# Patient Record
Sex: FEMALE | Employment: FULL TIME | URBAN - METROPOLITAN AREA
[De-identification: names, ages, dates, MRNs, and addresses within clinical notes are randomized per-mention and may not be internally consistent; named-entity substitution may affect disease eponyms.]

---

## 2021-09-30 ENCOUNTER — TELEPHONE (OUTPATIENT)
Dept: PRIMARY CARE CLINIC | Age: 56
End: 2021-09-30

## 2021-09-30 NOTE — TELEPHONE ENCOUNTER
----- Message from Flor Guerrero sent at 9/30/2021 10:17 AM EDT -----  Subject: Message to Provider    QUESTIONS  Information for Provider? patient is requesting for Dr. Caro Ruiz to be   her pcp since she's been seeing Dr. Steven Paul as her pcp from outside [address] [place] [address],   patient wanted to be seen for a yearly physical, since patient has no pcp   on file yet and requested to be seen by Dr. Steven Paul, patient is asking if   possible to get a yearly physical and be Dr. Snehal Triplett new patient the same   time. please call patient and advise.  ---------------------------------------------------------------------------  --------------  CALL BACK INFO  What is the best way for the office to contact you? OK to leave message on   voicemail  Preferred Call Back Phone Number? [phone]  ---------------------------------------------------------------------------  --------------  SCRIPT ANSWERS  Relationship to Patient?  Self

## 2021-10-07 ENCOUNTER — OFFICE VISIT (OUTPATIENT)
Dept: PRIMARY CARE CLINIC | Age: 56
End: 2021-10-07
Payer: COMMERCIAL

## 2021-10-07 VITALS
DIASTOLIC BLOOD PRESSURE: 70 MMHG | HEART RATE: 74 BPM | BODY MASS INDEX: 29.55 KG/M2 | OXYGEN SATURATION: 98 % | WEIGHT: 195 LBS | SYSTOLIC BLOOD PRESSURE: 122 MMHG | HEIGHT: 68 IN

## 2021-10-07 DIAGNOSIS — Z00.00 WELL ADULT EXAM: Primary | ICD-10-CM

## 2021-10-07 DIAGNOSIS — Z13.9 SCREENING DUE: ICD-10-CM

## 2021-10-07 PROCEDURE — 99396 PREV VISIT EST AGE 40-64: CPT | Performed by: FAMILY MEDICINE

## 2021-10-07 RX ORDER — OMEPRAZOLE 20 MG/1
20 CAPSULE, DELAYED RELEASE ORAL DAILY
Qty: 90 CAPSULE | Refills: 3 | Status: SHIPPED | OUTPATIENT
Start: 2021-10-07 | End: 2022-08-23

## 2021-10-07 RX ORDER — ALBUTEROL SULFATE 90 UG/1
2 AEROSOL, METERED RESPIRATORY (INHALATION) EVERY 6 HOURS PRN
COMMUNITY
End: 2022-07-18 | Stop reason: SDUPTHER

## 2021-10-07 RX ORDER — TRIAMCINOLONE ACETONIDE 55 UG/1
SPRAY, METERED NASAL
COMMUNITY

## 2021-10-07 RX ORDER — ESTRADIOL 2 MG/1
TABLET ORAL
COMMUNITY
Start: 2021-01-29

## 2021-10-07 RX ORDER — CETIRIZINE HYDROCHLORIDE 10 MG/1
CAPSULE, LIQUID FILLED ORAL
COMMUNITY

## 2021-10-07 RX ORDER — OMEPRAZOLE 20 MG/1
CAPSULE, DELAYED RELEASE ORAL
COMMUNITY
End: 2021-10-07 | Stop reason: SDUPTHER

## 2021-10-07 SDOH — ECONOMIC STABILITY: FOOD INSECURITY: WITHIN THE PAST 12 MONTHS, YOU WORRIED THAT YOUR FOOD WOULD RUN OUT BEFORE YOU GOT MONEY TO BUY MORE.: NEVER TRUE

## 2021-10-07 SDOH — ECONOMIC STABILITY: FOOD INSECURITY: WITHIN THE PAST 12 MONTHS, THE FOOD YOU BOUGHT JUST DIDN'T LAST AND YOU DIDN'T HAVE MONEY TO GET MORE.: NEVER TRUE

## 2021-10-07 ASSESSMENT — PATIENT HEALTH QUESTIONNAIRE - PHQ9
SUM OF ALL RESPONSES TO PHQ QUESTIONS 1-9: 0
1. LITTLE INTEREST OR PLEASURE IN DOING THINGS: 0
SUM OF ALL RESPONSES TO PHQ9 QUESTIONS 1 & 2: 0
2. FEELING DOWN, DEPRESSED OR HOPELESS: 0

## 2021-10-07 ASSESSMENT — SOCIAL DETERMINANTS OF HEALTH (SDOH): HOW HARD IS IT FOR YOU TO PAY FOR THE VERY BASICS LIKE FOOD, HOUSING, MEDICAL CARE, AND HEATING?: NOT HARD AT ALL

## 2021-10-11 ASSESSMENT — ENCOUNTER SYMPTOMS
RESPIRATORY NEGATIVE: 1
ALLERGIC/IMMUNOLOGIC NEGATIVE: 1
EYES NEGATIVE: 1
GASTROINTESTINAL NEGATIVE: 1

## 2021-10-11 NOTE — PROGRESS NOTES
Subjective:      Patient ID: Antonella Cantu is a 49 y.o. female. Doing well      Review of Systems   Constitutional: Negative. HENT: Negative. Eyes: Negative. Respiratory: Negative. Cardiovascular: Negative. Gastrointestinal: Negative. Endocrine: Negative. Genitourinary: Negative. Musculoskeletal: Negative. Allergic/Immunologic: Negative. Neurological: Negative. Hematological: Negative. Psychiatric/Behavioral: Negative. All other systems reviewed and are negative. Objective:   Physical Exam  Vitals reviewed. Constitutional:       Appearance: She is normal weight. HENT:      Head: Normocephalic. Right Ear: Tympanic membrane normal.      Left Ear: Tympanic membrane normal.      Nose: Nose normal.   Eyes:      Pupils: Pupils are equal, round, and reactive to light. Cardiovascular:      Rate and Rhythm: Normal rate and regular rhythm. Pulmonary:      Effort: Pulmonary effort is normal.      Breath sounds: Normal breath sounds. Musculoskeletal:         General: Normal range of motion. Cervical back: Normal range of motion. Skin:     General: Skin is warm. Neurological:      General: No focal deficit present. Mental Status: She is alert. Psychiatric:         Mood and Affect: Mood normal.         Thought Content: Thought content normal.         Assessment:      1. Screening due    2. Well adult exam            Plan:      Angel Espino was seen today for annual exam and other. Diagnoses and all orders for this visit:    Screening due  -     Lipid, Fasting; Future  -     Hemoglobin A1C; Future  -     CBC With Auto Differential; Future  -     TSH; Future    Well adult exam    Other orders  -     omeprazole (PRILOSEC) 20 MG delayed release capsule;  Take 1 capsule by mouth Daily                Electronically signed by Jaya Mccormick MD on 10/7/2021 at 9:30 AM

## 2021-10-13 DIAGNOSIS — Z13.9 SCREENING DUE: ICD-10-CM

## 2021-10-13 LAB
AVERAGE GLUCOSE: NORMAL
BASOPHILS ABSOLUTE: NORMAL
BASOPHILS RELATIVE PERCENT: NORMAL
CHOLESTEROL, FASTING: NORMAL
EOSINOPHILS ABSOLUTE: NORMAL
EOSINOPHILS RELATIVE PERCENT: NORMAL
HBA1C MFR BLD: NORMAL %
HCT VFR BLD CALC: NORMAL %
HDLC SERPL-MCNC: NORMAL MG/DL
HEMOGLOBIN: NORMAL
LDL CHOLESTEROL CALCULATED: NORMAL
LYMPHOCYTES ABSOLUTE: NORMAL
LYMPHOCYTES RELATIVE PERCENT: NORMAL
MCH RBC QN AUTO: NORMAL PG
MCHC RBC AUTO-ENTMCNC: NORMAL G/DL
MCV RBC AUTO: NORMAL FL
MONOCYTES ABSOLUTE: NORMAL
MONOCYTES RELATIVE PERCENT: NORMAL
NEUTROPHILS ABSOLUTE: NORMAL
NEUTROPHILS RELATIVE PERCENT: NORMAL
PDW BLD-RTO: NORMAL %
PLATELET # BLD: NORMAL 10*3/UL
PMV BLD AUTO: NORMAL FL
RBC # BLD: NORMAL 10*6/UL
TRIGLYCERIDE, FASTING: NORMAL
TSH SERPL DL<=0.05 MIU/L-ACNC: NORMAL M[IU]/L
WBC # BLD: NORMAL 10*3/UL

## 2022-02-16 ENCOUNTER — PATIENT MESSAGE (OUTPATIENT)
Dept: PRIMARY CARE CLINIC | Age: 57
End: 2022-02-16

## 2022-03-01 NOTE — TELEPHONE ENCOUNTER
From: Ami Fishman  To: Dr. Zhang Ledbetter: 2/16/2022 2:57 PM EST  Subject: Serevent Diskus    It looks like this was not filled last month due to my insurance requiring a 3 month supply.

## 2022-03-18 LAB — MAMMOGRAPHY, EXTERNAL: NORMAL

## 2022-07-18 ENCOUNTER — OFFICE VISIT (OUTPATIENT)
Dept: PRIMARY CARE CLINIC | Age: 57
End: 2022-07-18
Payer: COMMERCIAL

## 2022-07-18 VITALS
HEIGHT: 68 IN | WEIGHT: 197.4 LBS | BODY MASS INDEX: 29.92 KG/M2 | HEART RATE: 76 BPM | SYSTOLIC BLOOD PRESSURE: 124 MMHG | OXYGEN SATURATION: 99 % | DIASTOLIC BLOOD PRESSURE: 76 MMHG

## 2022-07-18 DIAGNOSIS — J45.40 MODERATE PERSISTENT ASTHMA, UNSPECIFIED WHETHER COMPLICATED: Primary | ICD-10-CM

## 2022-07-18 DIAGNOSIS — E78.2 MIXED HYPERLIPIDEMIA: ICD-10-CM

## 2022-07-18 PROCEDURE — 99214 OFFICE O/P EST MOD 30 MIN: CPT | Performed by: FAMILY MEDICINE

## 2022-07-18 RX ORDER — ALBUTEROL SULFATE 90 UG/1
2 AEROSOL, METERED RESPIRATORY (INHALATION) EVERY 6 HOURS PRN
Qty: 18 G | Refills: 3 | Status: SHIPPED | OUTPATIENT
Start: 2022-07-18

## 2022-07-18 ASSESSMENT — ENCOUNTER SYMPTOMS
DIARRHEA: 0
WHEEZING: 1
SORE THROAT: 0
CHEST TIGHTNESS: 0
SHORTNESS OF BREATH: 1
ABDOMINAL PAIN: 0
COUGH: 0
CONSTIPATION: 0
BACK PAIN: 0

## 2022-07-18 ASSESSMENT — PATIENT HEALTH QUESTIONNAIRE - PHQ9
SUM OF ALL RESPONSES TO PHQ QUESTIONS 1-9: 0
2. FEELING DOWN, DEPRESSED OR HOPELESS: 0
SUM OF ALL RESPONSES TO PHQ QUESTIONS 1-9: 0
1. LITTLE INTEREST OR PLEASURE IN DOING THINGS: 0
SUM OF ALL RESPONSES TO PHQ9 QUESTIONS 1 & 2: 0

## 2022-07-18 NOTE — PROGRESS NOTES
Subjective:     Patient ID: Damian Kidd is a 49 y.o. female    Asthma  She complains of shortness of breath and wheezing. There is no cough. Pertinent negatives include no appetite change, chest pain, fever, headaches or sore throat. Her past medical history is significant for asthma. Works customer service at [employer]   2 children      likes to remodel    Neg smoker   occasional drinker    Review of Systems   Constitutional:  Negative for activity change, appetite change, fatigue and fever. HENT:  Negative for sore throat. Eyes:  Negative for visual disturbance. Respiratory:  Positive for shortness of breath and wheezing. Negative for cough and chest tightness. Cardiovascular:  Negative for chest pain, palpitations and leg swelling. Gastrointestinal:  Negative for abdominal pain, constipation and diarrhea. Endocrine: Negative for cold intolerance. Genitourinary:  Negative for dysuria and urgency. Musculoskeletal:  Negative for back pain. Neurological:  Negative for dizziness, syncope and headaches. Hematological:  Does not bruise/bleed easily. Psychiatric/Behavioral:  Negative for confusion and sleep disturbance. The patient is not nervous/anxious. Objective:     Physical Exam  Vitals and nursing note reviewed. Constitutional:       General: She is not in acute distress. Appearance: Normal appearance. She is obese. She is not ill-appearing. HENT:      Head: Normocephalic. Right Ear: External ear normal.      Left Ear: External ear normal.      Nose: Nose normal.      Mouth/Throat:      Mouth: Mucous membranes are moist.      Pharynx: Oropharynx is clear. Eyes:      Conjunctiva/sclera: Conjunctivae normal.      Pupils: Pupils are equal, round, and reactive to light. Cardiovascular:      Rate and Rhythm: Normal rate and regular rhythm. Pulses: Normal pulses. Heart sounds: Normal heart sounds. No murmur heard.   Pulmonary:      Effort: Pulmonary effort is normal.      Breath sounds: Normal breath sounds. No wheezing. Musculoskeletal:         General: Normal range of motion. Cervical back: Neck supple. Right lower leg: No edema. Left lower leg: No edema. Lymphadenopathy:      Cervical: No cervical adenopathy. Skin:     General: Skin is warm and dry. Capillary Refill: Capillary refill takes less than 2 seconds. Neurological:      General: No focal deficit present. Mental Status: She is alert and oriented to person, place, and time. Psychiatric:         Mood and Affect: Mood normal.         Behavior: Behavior normal.       Assessment/Plan:     1. Moderate persistent asthma, unspecified whether complicated    2. BMI 30.0-30.9,adult    3. Mixed hyperlipidemia          Maegan Matthews was seen today for asthma. Diagnoses and all orders for this visit:    Moderate persistent asthma, unspecified whether complicated  -     salmeterol (SEREVENT DISKUS) 50 MCG/DOSE diskus inhaler; Inhale 1 puff into the lungs in the morning and 1 puff before bedtime. -     albuterol sulfate HFA (PROVENTIL;VENTOLIN;PROAIR) 108 (90 Base) MCG/ACT inhaler; Inhale 2 puffs into the lungs every 6 hours as needed for Wheezing    BMI 30.0-30.9,adult  Discussed the simple 7 and the anti-inflammatory lifestyle handouts given  Mixed hyperlipidemia  Found by chart review. Described the lipid levels to the patient. Discussed remedies. India Bauer MD    Please note that this chart was generated using voice recognition Dragon dictation software. Although every effort was made to ensure the accuracy of this automated transcription, some errors in transcription may have occurred.

## 2022-07-20 DIAGNOSIS — Z12.39 SCREENING BREAST EXAMINATION: Primary | ICD-10-CM

## 2022-08-23 ENCOUNTER — OFFICE VISIT (OUTPATIENT)
Dept: PRIMARY CARE CLINIC | Age: 57
End: 2022-08-23
Payer: COMMERCIAL

## 2022-08-23 VITALS
BODY MASS INDEX: 2.97 KG/M2 | SYSTOLIC BLOOD PRESSURE: 124 MMHG | DIASTOLIC BLOOD PRESSURE: 86 MMHG | HEIGHT: 68 IN | OXYGEN SATURATION: 100 % | HEART RATE: 116 BPM | WEIGHT: 19.6 LBS

## 2022-08-23 DIAGNOSIS — J02.9 PHARYNGITIS, UNSPECIFIED ETIOLOGY: Primary | ICD-10-CM

## 2022-08-23 DIAGNOSIS — R11.0 NAUSEA: ICD-10-CM

## 2022-08-23 DIAGNOSIS — H65.199 ACUTE OTITIS MEDIA WITH EFFUSION: ICD-10-CM

## 2022-08-23 DIAGNOSIS — J45.40 MODERATE PERSISTENT ASTHMA, UNSPECIFIED WHETHER COMPLICATED: ICD-10-CM

## 2022-08-23 LAB — S PYO AG THROAT QL: NORMAL

## 2022-08-23 PROCEDURE — 99214 OFFICE O/P EST MOD 30 MIN: CPT | Performed by: NURSE PRACTITIONER

## 2022-08-23 PROCEDURE — 87880 STREP A ASSAY W/OPTIC: CPT | Performed by: NURSE PRACTITIONER

## 2022-08-23 RX ORDER — DOXYCYCLINE HYCLATE 100 MG
100 TABLET ORAL 2 TIMES DAILY
Qty: 14 TABLET | Refills: 0 | Status: SHIPPED | OUTPATIENT
Start: 2022-08-23 | End: 2022-08-30

## 2022-08-23 RX ORDER — ALBUTEROL SULFATE 2.5 MG/3ML
2.5 SOLUTION RESPIRATORY (INHALATION) EVERY 6 HOURS PRN
Qty: 120 EACH | Refills: 3 | Status: SHIPPED | OUTPATIENT
Start: 2022-08-23

## 2022-08-23 RX ORDER — ONDANSETRON 4 MG/1
4 TABLET, ORALLY DISINTEGRATING ORAL 3 TIMES DAILY PRN
Qty: 21 TABLET | Refills: 0 | Status: SHIPPED | OUTPATIENT
Start: 2022-08-23

## 2022-08-23 ASSESSMENT — ENCOUNTER SYMPTOMS
SINUS PAIN: 1
SHORTNESS OF BREATH: 0
COLOR CHANGE: 0
RHINORRHEA: 0
VOMITING: 0
SORE THROAT: 1
DIARRHEA: 0
NAUSEA: 0
CHEST TIGHTNESS: 0
ABDOMINAL PAIN: 0

## 2022-08-23 NOTE — LETTER
[address] [facility]  [address] [place] [address]  Phone: [phone]  Fax: [phone]    RACHEL Bo CNP        August 23, 2022     Patient: Yash Murray   YOB: 1965   Date of Visit: 8/23/2022       To Whom It May Concern: It is my medical opinion that Sintia Sensing should remain out of work until 8/25/22. If you have any questions or concerns, please don't hesitate to call.     Sincerely,        RACHEL Bo CNP

## 2022-08-23 NOTE — PROGRESS NOTES
[address] [facility] [address] [address]  Dept: [phone]  Dept Fax: [phone]    Yash Murray is a 56 y.o. female who presentstoday for her medical conditions/complaints as noted below. Yash Murray is c/o of  Chief Complaint   Patient presents with    Generalized Body Aches     Started yesterday. Patient states the symptoms keep getting worse as the day goes on. Patient took an at home covid test and the result was negative. No fever last night or this morning. Chills    Pharyngitis          Headache         HPI:     Here with acute complaint of sore throat, chills, body aches and sore throat x 2 days  Negative Covid test today at her work  No improvement with otc meds  Had strep exposure, has concern for this  Is ill appearing but non-toxic, no acute distress noted  Denies GI symptoms  PO intake somewhat poor due to body aches, slightly tachycardic, discussed dehydration  PMH significant for asthma, she would like albuterol neb solution refilled for as needed use  When she is ill, she does experience exacerbation of SOB intermittently, she is otherwise well controlled with controller and rescue inhalers      No results found for: LABA1C          ( goal A1C is < 7)   No results found for: LABMICR  No results found for: LDLCHOLESTEROL, LDLCALC    (goal LDL is <100)   No results found for: AST, ALT, BUN, CR  BP Readings from Last 3 Encounters:   08/23/22 124/86   07/18/22 124/76   10/07/21 122/70          ([id number])    No past medical history on file. No past surgical history on file. No family history on file.     Social History     Tobacco Use    Smoking status: Never    Smokeless tobacco: Never   Substance Use Topics    Alcohol use: Yes     Comment: occasinally      Current Outpatient Medications   Medication Sig Dispense Refill    doxycycline hyclate (VIBRA-TABS) 100 MG tablet Take 1 tablet by mouth 2 times daily for 7 days 14 tablet 0 ondansetron (ZOFRAN-ODT) 4 MG disintegrating tablet Take 1 tablet by mouth 3 times daily as needed for Nausea or Vomiting 21 tablet 0    albuterol (PROVENTIL) (2.5 MG/3ML) 0.083% nebulizer solution Take 3 mLs by nebulization every 6 hours as needed for Wheezing 120 each 3    salmeterol (SEREVENT DISKUS) 50 MCG/DOSE diskus inhaler Inhale 1 puff into the lungs in the morning and 1 puff before bedtime. 3 each 3    albuterol sulfate HFA (PROVENTIL;VENTOLIN;PROAIR) 108 (90 Base) MCG/ACT inhaler Inhale 2 puffs into the lungs every 6 hours as needed for Wheezing 18 g 3    Multiple Vitamin (MULTIVITAMIN ADULT PO) Women's Multiple Vitamins      Cetirizine HCl (ZYRTEC ALLERGY) 10 MG CAPS Zyrtec   OTC daily      estradiol (ESTRACE) 2 MG tablet estradiol 2 mg tablet   Take 1 tablet every day by oral route. triamcinolone (NASACORT) 55 MCG/ACT nasal inhaler       omeprazole (PRILOSEC) 20 MG delayed release capsule Take 1 capsule by mouth Daily 90 capsule 3     No current facility-administered medications for this visit. Allergies   Allergen Reactions    Amoxicillin-Pot Clavulanate Nausea Only     Other reaction(s): GI Disturbance, Vomit, Vomiting  Other reaction(s): Vomit         Health Maintenance   Topic Date Due    Pneumococcal 0-64 years Vaccine (1 - PCV) Never done    HIV screen  Never done    Hepatitis C screen  Never done    DTaP/Tdap/Td vaccine (1 - Tdap) Never done    Shingles vaccine (1 of 2) Never done    COVID-19 Vaccine (2 - Booster for Linette series) 05/06/2021    Flu vaccine (1) 09/01/2022    Breast cancer screen  03/18/2023    Depression Screen  07/18/2023    Lipids  10/13/2026    Colorectal Cancer Screen  12/01/2026    Hepatitis A vaccine  Aged Out    Hepatitis B vaccine  Aged Out    Hib vaccine  Aged Out    Meningococcal (ACWY) vaccine  Aged Out       Subjective:      Review of Systems   Constitutional:  Positive for chills and fatigue. Negative for activity change and fever.    HENT:  Positive for sinus pain and sore throat. Negative for congestion and rhinorrhea. Eyes:  Negative for visual disturbance. Respiratory:  Negative for chest tightness and shortness of breath. Cardiovascular:  Negative for chest pain and palpitations. Gastrointestinal:  Negative for abdominal pain, diarrhea, nausea and vomiting. Endocrine: Negative for polydipsia. Genitourinary:  Negative for difficulty urinating. Musculoskeletal:  Positive for myalgias. Negative for arthralgias. Skin:  Negative for color change. Neurological:  Positive for headaches. Negative for weakness. Psychiatric/Behavioral:  Negative for behavioral problems. The patient is not nervous/anxious. All other systems reviewed and are negative. Objective:   /86   Pulse (!) 116   Ht 5' 8\" (1.727 m)   Wt 19 lb 9.6 oz (8.891 kg)   LMP 04/07/2005 (Approximate)   SpO2 100%   BMI 2.98 kg/m²   Physical Exam  Vitals reviewed. Constitutional:       General: She is not in acute distress. Appearance: Normal appearance. HENT:      Head: Normocephalic. Right Ear: A middle ear effusion is present. Tympanic membrane is erythematous. Tympanic membrane is not bulging. Left Ear: A middle ear effusion is present. Tympanic membrane is erythematous. Tympanic membrane is not bulging. Nose: Nose normal.      Mouth/Throat:      Pharynx: Oropharynx is clear. Posterior oropharyngeal erythema present. Tonsils: No tonsillar exudate. Eyes:      Pupils: Pupils are equal, round, and reactive to light. Cardiovascular:      Rate and Rhythm: Regular rhythm. Tachycardia present. Pulses: Normal pulses. Heart sounds: Normal heart sounds. Pulmonary:      Effort: Pulmonary effort is normal.      Breath sounds: Normal breath sounds. Abdominal:      General: There is no distension. Musculoskeletal:         General: Normal range of motion. Cervical back: Neck supple. Tenderness present.       Right lower leg: No edema. Left lower leg: No edema. Lymphadenopathy:      Cervical: No cervical adenopathy. Skin:     General: Skin is warm and dry. Capillary Refill: Capillary refill takes less than 2 seconds. Neurological:      General: No focal deficit present. Mental Status: She is alert and oriented to person, place, and time. Psychiatric:         Mood and Affect: Mood normal.         Behavior: Behavior normal.         :       Diagnosis Orders   1. Pharyngitis, unspecified etiology  POCT rapid strep A      2. Acute otitis media with effusion  doxycycline hyclate (VIBRA-TABS) 100 MG tablet      3. Nausea  ondansetron (ZOFRAN-ODT) 4 MG disintegrating tablet      4. Moderate persistent asthma, unspecified whether complicated  albuterol (PROVENTIL) (2.5 MG/3ML) 0.083% nebulizer solution                :          1. Pharyngitis, unspecified etiology  New, poc strep negative. Likely viral etiology, continue supportive care at home- increased fluids, rest, tylenol/ibuprofen PRN, salt water gargles. - POCT rapid strep A    2. Acute otitis media with effusion  New, may be secondary to acute viral illness and  increase in sinus congestion. Will treat with doxy BID x  7 days, continue to monitor and f/u PRN  - doxycycline hyclate (VIBRA-TABS) 100 MG tablet; Take 1 tablet by mouth 2 times daily for 7 days  Dispense: 14 tablet; Refill: 0    3. Nausea  Waxing and waning, trial zofran PRN, increase PO fluids to avoid dehydration, advance diet as tolerated   - ondansetron (ZOFRAN-ODT) 4 MG disintegrating tablet; Take 1 tablet by mouth 3 times daily as needed for Nausea or Vomiting  Dispense: 21 tablet; Refill: 0    4. Moderate persistent asthma, unspecified whether complicated  Stable, albuterol nebulizer solution refilled, PRN use when acutely ill primarily. Continue salmeterol BID    - albuterol (PROVENTIL) (2.5 MG/3ML) 0.083% nebulizer solution;  Take 3 mLs by nebulization every 6 hours as needed for Wheezing  Dispense:

## 2022-12-29 ENCOUNTER — TELEPHONE (OUTPATIENT)
Dept: PRIMARY CARE CLINIC | Age: 57
End: 2022-12-29

## 2022-12-29 ENCOUNTER — NURSE TRIAGE (OUTPATIENT)
Dept: OTHER | Facility: CLINIC | Age: 57
End: 2022-12-29

## 2022-12-29 NOTE — TELEPHONE ENCOUNTER
----- Message from Aram Thierry sent at 12/29/2022 11:17 AM EST -----  Subject: Appointment Request    Reason for Call: Established Patient Appointment needed: Routine Return   from RN Triage    QUESTIONS    Reason for appointment request? No appointments available during search     Additional Information for Provider? Elva Boyd just returned from nt with   high bp and dehydration.  The disposition was to be seen in the next two   weeks but I dont see anything until March and the office is currently   unavailable.   ---------------------------------------------------------------------------  --------------  [address]  [phone]; OK to leave message on voicemail  ---------------------------------------------------------------------------  --------------  SCRIPT ANSWERS  COVID Screen: Roverto Saldana

## 2022-12-29 NOTE — TELEPHONE ENCOUNTER
----- Message from Marlenajose Banegas sent at 12/29/2022 11:17 AM EST -----  Subject: Appointment Request    Reason for Call: Established Patient Appointment needed: Routine Return   from RN Triage    QUESTIONS    Reason for appointment request? No appointments available during search     Additional Information for Provider? Sekou Pelayo just returned from nt with   high bp and dehydration.  The disposition was to be seen in the next two   weeks but I dont see anything until March and the office is currently   unavailable.   ---------------------------------------------------------------------------  --------------  [address] [employer]  [phone]; OK to leave message on voicemail  ---------------------------------------------------------------------------  --------------  SCRIPT ANSWERS  COVID Screen: Al Ruffin

## 2023-02-01 ENCOUNTER — COMMUNITY OUTREACH (OUTPATIENT)
Dept: PRIMARY CARE CLINIC | Age: 58
End: 2023-02-01

## 2023-02-01 NOTE — PROGRESS NOTES
Patient's HM shows they are current for Colorectal Screening and Mammogram Screening. [employer] and  files searched with success. Results attached to order and HM updated.        Patient is overdue for Cervical Cancer Screen

## 2023-05-12 NOTE — TELEPHONE ENCOUNTER
Location of patient: [place] call from [facility] at [facility] with [employer]. Subjective: Caller states \"high blood pressure\"     Current Symptoms: see above, started yesterday with stomachache and diarrhea, she was brushing her teeth this morning and felt like her heart was racing. She checked her BP and it was 131/100, 141/83, 159/98, 151/84, 159/99, no diagnosed HTN, no BP meds. Thinks she may be dehydrated from the diarrhea yesterday. Denies any symptoms, was dizzy yesterday    Onset: 1 day ago; sudden    Associated Symptoms: reduced appetite, reduced fluid intake, diarrhea    Pain Severity: 0/10; N/A; none    Temperature: denies fever     What has been tried: gas x yesterday, pepto tablets    LMP: NA Pregnant: NA    Recommended disposition: See in Office Within 2 Weeks    Care advice provided, patient verbalizes understanding; denies any other questions or concerns; instructed to call back for any new or worsening symptoms. Patient/Caller agrees with recommended disposition; writer provided warm transfer to [place] at [facility] for appointment scheduling    Attention Provider: Thank you for allowing me to participate in the care of your patient. The patient was connected to triage in response to information provided to the ECC/PSC. Please do not respond through this encounter as the response is not directed to a shared pool.       Reason for Disposition   Systolic BP >= 170 OR Diastolic >= 80, and is not taking BP medications    Protocols used: Blood Pressure - High-ADULT-OH Residual. S/p BLRrc. Follow.

## 2023-08-02 DIAGNOSIS — J45.40 MODERATE PERSISTENT ASTHMA, UNSPECIFIED WHETHER COMPLICATED: ICD-10-CM

## 2023-08-02 RX ORDER — SALMETEROL XINAFOATE 50 MCG
BLISTER, WITH INHALATION DEVICE INHALATION
Refills: 3 | OUTPATIENT
Start: 2023-08-02